# Patient Record
Sex: MALE | Race: WHITE
[De-identification: names, ages, dates, MRNs, and addresses within clinical notes are randomized per-mention and may not be internally consistent; named-entity substitution may affect disease eponyms.]

---

## 2022-04-11 PROBLEM — Z00.00 ENCOUNTER FOR PREVENTIVE HEALTH EXAMINATION: Status: ACTIVE | Noted: 2022-04-11

## 2022-04-14 ENCOUNTER — APPOINTMENT (OUTPATIENT)
Dept: ORTHOPEDIC SURGERY | Facility: CLINIC | Age: 57
End: 2022-04-14
Payer: OTHER MISCELLANEOUS

## 2022-04-14 VITALS — HEIGHT: 73 IN | BODY MASS INDEX: 25.18 KG/M2 | WEIGHT: 190 LBS

## 2022-04-14 DIAGNOSIS — S63.8X2D SPRAIN OF OTHER PART OF LEFT WRIST AND HAND, SUBSEQUENT ENCOUNTER: ICD-10-CM

## 2022-04-14 DIAGNOSIS — M18.12 UNILATERAL PRIMARY OSTEOARTHRITIS OF FIRST CARPOMETACARPAL JOINT, LEFT HAND: ICD-10-CM

## 2022-04-14 DIAGNOSIS — I10 ESSENTIAL (PRIMARY) HYPERTENSION: ICD-10-CM

## 2022-04-14 PROCEDURE — 99455 WORK RELATED DISABILITY EXAM: CPT

## 2022-04-14 PROCEDURE — 99072 ADDL SUPL MATRL&STAF TM PHE: CPT

## 2022-04-14 NOTE — WORK
[Has the patient reached Maximum Medical Improvement? If yes, indicate date___] : Yes, on [unfilled] [Is there permanent partial impairment?] : Yes [FreeTextEntry6] : Thumb CMC joint [Left] : left [FreeTextEntry7] : LEFT thumb [FreeTextEntry8] : As noted in medical record [FreeTextEntry5] : 20% [de-identified] : Restricted ROM of MP, PIP and opposition

## 2022-04-14 NOTE — HISTORY OF PRESENT ILLNESS
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 Last CSI 2/23/22, without relief. Discussed surgical intervention at the last visit which pt was not ready to proceed with. Reports his symptoms have been worse the past 3 weeks.  [] : no [FreeTextEntry1] : left hand, thumb [FreeTextEntry3] : 1/21/2022 [de-identified] : None

## 2022-04-14 NOTE — PHYSICAL EXAM
[FreeTextEntry9] : LEFT thumb IP bends to 45. Thumb MP bends to 40. Opposes to 4th metacarpal head. Flexion/Extension: 75/75

## 2022-06-28 ENCOUNTER — FORM ENCOUNTER (OUTPATIENT)
Age: 57
End: 2022-06-28

## 2023-10-31 ENCOUNTER — OFFICE (OUTPATIENT)
Facility: LOCATION | Age: 58
Setting detail: OPHTHALMOLOGY
End: 2023-10-31
Payer: COMMERCIAL

## 2023-10-31 DIAGNOSIS — H25.13: ICD-10-CM

## 2023-10-31 DIAGNOSIS — H16.223: ICD-10-CM

## 2023-10-31 DIAGNOSIS — G43.809: ICD-10-CM

## 2023-10-31 DIAGNOSIS — H31.29: ICD-10-CM

## 2023-10-31 DIAGNOSIS — H40.013: ICD-10-CM

## 2023-10-31 PROCEDURE — 92250 FUNDUS PHOTOGRAPHY W/I&R: CPT | Performed by: OPHTHALMOLOGY

## 2023-10-31 PROCEDURE — 99203 OFFICE O/P NEW LOW 30 MIN: CPT | Performed by: OPHTHALMOLOGY

## 2023-10-31 PROCEDURE — 92020 GONIOSCOPY: CPT | Performed by: OPHTHALMOLOGY

## 2023-10-31 ASSESSMENT — SUPERFICIAL PUNCTATE KERATITIS (SPK)
OD_SPK: T
OS_SPK: T

## 2023-10-31 ASSESSMENT — CONFRONTATIONAL VISUAL FIELD TEST (CVF)
OD_FINDINGS: FULL
OS_FINDINGS: FULL

## 2023-10-31 ASSESSMENT — TONOMETRY
OD_IOP_MMHG: 14
OS_IOP_MMHG: 16

## 2023-11-01 ASSESSMENT — VISUAL ACUITY
OS_BCVA: 20/20
OD_BCVA: 20/20

## 2023-11-01 ASSESSMENT — REFRACTION_AUTOREFRACTION
OS_SPHERE: -0.25
OD_CYLINDER: +0.50
OD_SPHERE: -0.50
OD_AXIS: 060
OS_CYLINDER: SPHERE

## 2023-11-01 ASSESSMENT — SPHEQUIV_DERIVED: OD_SPHEQUIV: -0.25

## 2024-05-01 ENCOUNTER — OFFICE (OUTPATIENT)
Facility: LOCATION | Age: 59
Setting detail: OPHTHALMOLOGY
End: 2024-05-01
Payer: COMMERCIAL

## 2024-05-01 DIAGNOSIS — H16.223: ICD-10-CM

## 2024-05-01 DIAGNOSIS — G43.809: ICD-10-CM

## 2024-05-01 DIAGNOSIS — H25.13: ICD-10-CM

## 2024-05-01 DIAGNOSIS — H31.29: ICD-10-CM

## 2024-05-01 DIAGNOSIS — H40.013: ICD-10-CM

## 2024-05-01 PROCEDURE — 92133 CPTRZD OPH DX IMG PST SGM ON: CPT | Performed by: OPHTHALMOLOGY

## 2024-05-01 PROCEDURE — 92014 COMPRE OPH EXAM EST PT 1/>: CPT | Performed by: OPHTHALMOLOGY

## 2024-05-01 PROCEDURE — 92083 EXTENDED VISUAL FIELD XM: CPT | Performed by: OPHTHALMOLOGY

## 2024-05-01 PROCEDURE — 92283 EXTND COLOR VISION XM: CPT | Performed by: OPHTHALMOLOGY

## 2024-05-01 ASSESSMENT — CONFRONTATIONAL VISUAL FIELD TEST (CVF)
OD_FINDINGS: FULL
OS_FINDINGS: FULL